# Patient Record
Sex: MALE | Race: WHITE | NOT HISPANIC OR LATINO
[De-identification: names, ages, dates, MRNs, and addresses within clinical notes are randomized per-mention and may not be internally consistent; named-entity substitution may affect disease eponyms.]

---

## 2017-01-05 ENCOUNTER — MEDICATION RENEWAL (OUTPATIENT)
Age: 62
End: 2017-01-05

## 2017-02-07 ENCOUNTER — MEDICATION RENEWAL (OUTPATIENT)
Age: 62
End: 2017-02-07

## 2017-02-07 ENCOUNTER — APPOINTMENT (OUTPATIENT)
Dept: PAIN MANAGEMENT | Facility: CLINIC | Age: 62
End: 2017-02-07

## 2017-03-07 ENCOUNTER — APPOINTMENT (OUTPATIENT)
Dept: PAIN MANAGEMENT | Facility: CLINIC | Age: 62
End: 2017-03-07

## 2017-03-07 VITALS
HEART RATE: 104 BPM | SYSTOLIC BLOOD PRESSURE: 147 MMHG | BODY MASS INDEX: 34.07 KG/M2 | WEIGHT: 274 LBS | DIASTOLIC BLOOD PRESSURE: 93 MMHG | HEIGHT: 75 IN

## 2017-03-29 ENCOUNTER — MEDICATION RENEWAL (OUTPATIENT)
Age: 62
End: 2017-03-29

## 2017-05-02 ENCOUNTER — APPOINTMENT (OUTPATIENT)
Dept: PAIN MANAGEMENT | Facility: CLINIC | Age: 62
End: 2017-05-02

## 2017-05-02 VITALS
WEIGHT: 274 LBS | HEART RATE: 99 BPM | BODY MASS INDEX: 34.07 KG/M2 | DIASTOLIC BLOOD PRESSURE: 92 MMHG | SYSTOLIC BLOOD PRESSURE: 161 MMHG | HEIGHT: 75 IN

## 2017-05-25 ENCOUNTER — MEDICATION RENEWAL (OUTPATIENT)
Age: 62
End: 2017-05-25

## 2017-06-27 ENCOUNTER — APPOINTMENT (OUTPATIENT)
Dept: PAIN MANAGEMENT | Facility: CLINIC | Age: 62
End: 2017-06-27

## 2017-06-27 VITALS
DIASTOLIC BLOOD PRESSURE: 102 MMHG | HEART RATE: 99 BPM | WEIGHT: 274 LBS | BODY MASS INDEX: 34.07 KG/M2 | HEIGHT: 75 IN | SYSTOLIC BLOOD PRESSURE: 159 MMHG

## 2017-07-24 ENCOUNTER — MEDICATION RENEWAL (OUTPATIENT)
Age: 62
End: 2017-07-24

## 2017-08-22 ENCOUNTER — APPOINTMENT (OUTPATIENT)
Dept: PAIN MANAGEMENT | Facility: CLINIC | Age: 62
End: 2017-08-22
Payer: MEDICARE

## 2017-08-22 VITALS
DIASTOLIC BLOOD PRESSURE: 95 MMHG | BODY MASS INDEX: 34.07 KG/M2 | HEIGHT: 75 IN | WEIGHT: 274 LBS | SYSTOLIC BLOOD PRESSURE: 146 MMHG | HEART RATE: 105 BPM

## 2017-08-22 PROCEDURE — 99214 OFFICE O/P EST MOD 30 MIN: CPT

## 2017-10-17 ENCOUNTER — APPOINTMENT (OUTPATIENT)
Dept: PAIN MANAGEMENT | Facility: CLINIC | Age: 62
End: 2017-10-17
Payer: MEDICARE

## 2017-10-17 VITALS
WEIGHT: 274 LBS | HEART RATE: 87 BPM | DIASTOLIC BLOOD PRESSURE: 110 MMHG | BODY MASS INDEX: 34.07 KG/M2 | HEIGHT: 75 IN | SYSTOLIC BLOOD PRESSURE: 179 MMHG

## 2017-10-17 PROCEDURE — 99213 OFFICE O/P EST LOW 20 MIN: CPT

## 2017-12-12 ENCOUNTER — APPOINTMENT (OUTPATIENT)
Dept: PAIN MANAGEMENT | Facility: CLINIC | Age: 62
End: 2017-12-12
Payer: MEDICARE

## 2017-12-12 VITALS
WEIGHT: 265 LBS | DIASTOLIC BLOOD PRESSURE: 80 MMHG | HEART RATE: 105 BPM | HEIGHT: 75 IN | BODY MASS INDEX: 32.95 KG/M2 | SYSTOLIC BLOOD PRESSURE: 128 MMHG

## 2017-12-12 PROCEDURE — 99213 OFFICE O/P EST LOW 20 MIN: CPT

## 2018-01-05 ENCOUNTER — MEDICATION RENEWAL (OUTPATIENT)
Age: 63
End: 2018-01-05

## 2018-01-30 ENCOUNTER — MOBILE ON CALL (OUTPATIENT)
Age: 63
End: 2018-01-30

## 2018-02-06 ENCOUNTER — APPOINTMENT (OUTPATIENT)
Dept: PAIN MANAGEMENT | Facility: CLINIC | Age: 63
End: 2018-02-06
Payer: MEDICARE

## 2018-02-06 VITALS
HEIGHT: 75 IN | HEART RATE: 59 BPM | SYSTOLIC BLOOD PRESSURE: 167 MMHG | BODY MASS INDEX: 32.95 KG/M2 | WEIGHT: 265 LBS | DIASTOLIC BLOOD PRESSURE: 114 MMHG

## 2018-02-06 PROCEDURE — 99213 OFFICE O/P EST LOW 20 MIN: CPT

## 2018-02-28 ENCOUNTER — MEDICATION RENEWAL (OUTPATIENT)
Age: 63
End: 2018-02-28

## 2018-03-27 ENCOUNTER — APPOINTMENT (OUTPATIENT)
Dept: COLORECTAL SURGERY | Facility: CLINIC | Age: 63
End: 2018-03-27

## 2018-03-30 ENCOUNTER — APPOINTMENT (OUTPATIENT)
Dept: PAIN MANAGEMENT | Facility: CLINIC | Age: 63
End: 2018-03-30
Payer: MEDICARE

## 2018-03-30 VITALS
WEIGHT: 262 LBS | BODY MASS INDEX: 32.58 KG/M2 | DIASTOLIC BLOOD PRESSURE: 84 MMHG | HEIGHT: 75 IN | HEART RATE: 90 BPM | SYSTOLIC BLOOD PRESSURE: 154 MMHG

## 2018-03-30 PROCEDURE — 99213 OFFICE O/P EST LOW 20 MIN: CPT

## 2018-04-26 ENCOUNTER — MEDICATION RENEWAL (OUTPATIENT)
Age: 63
End: 2018-04-26

## 2018-05-18 ENCOUNTER — MEDICATION RENEWAL (OUTPATIENT)
Age: 63
End: 2018-05-18

## 2018-06-26 ENCOUNTER — APPOINTMENT (OUTPATIENT)
Dept: PAIN MANAGEMENT | Facility: CLINIC | Age: 63
End: 2018-06-26

## 2018-07-24 ENCOUNTER — APPOINTMENT (OUTPATIENT)
Dept: PAIN MANAGEMENT | Facility: CLINIC | Age: 63
End: 2018-07-24
Payer: MEDICARE

## 2018-07-24 VITALS
WEIGHT: 242 LBS | DIASTOLIC BLOOD PRESSURE: 97 MMHG | HEIGHT: 75 IN | BODY MASS INDEX: 30.09 KG/M2 | SYSTOLIC BLOOD PRESSURE: 156 MMHG | HEART RATE: 99 BPM

## 2018-07-24 PROCEDURE — 99213 OFFICE O/P EST LOW 20 MIN: CPT

## 2018-08-15 ENCOUNTER — MEDICATION RENEWAL (OUTPATIENT)
Age: 63
End: 2018-08-15

## 2018-09-12 ENCOUNTER — MEDICATION RENEWAL (OUTPATIENT)
Age: 63
End: 2018-09-12

## 2018-10-16 ENCOUNTER — APPOINTMENT (OUTPATIENT)
Dept: PAIN MANAGEMENT | Facility: CLINIC | Age: 63
End: 2018-10-16
Payer: MEDICARE

## 2018-10-16 VITALS
BODY MASS INDEX: 29.22 KG/M2 | HEART RATE: 89 BPM | WEIGHT: 235 LBS | HEIGHT: 75 IN | DIASTOLIC BLOOD PRESSURE: 89 MMHG | SYSTOLIC BLOOD PRESSURE: 164 MMHG

## 2018-10-16 PROCEDURE — 99213 OFFICE O/P EST LOW 20 MIN: CPT

## 2018-11-07 ENCOUNTER — MEDICATION RENEWAL (OUTPATIENT)
Age: 63
End: 2018-11-07

## 2019-01-08 ENCOUNTER — APPOINTMENT (OUTPATIENT)
Dept: PAIN MANAGEMENT | Facility: CLINIC | Age: 64
End: 2019-01-08
Payer: MEDICARE

## 2019-01-08 VITALS
WEIGHT: 235 LBS | HEART RATE: 102 BPM | HEIGHT: 75 IN | DIASTOLIC BLOOD PRESSURE: 110 MMHG | BODY MASS INDEX: 29.22 KG/M2 | SYSTOLIC BLOOD PRESSURE: 172 MMHG

## 2019-01-08 PROCEDURE — 99213 OFFICE O/P EST LOW 20 MIN: CPT

## 2019-01-08 NOTE — REASON FOR VISIT
[Follow-Up: _____] : a [unfilled] follow-up visit [FreeTextEntry1] : chronic lower back pain and bilateral knee/leg pain.

## 2019-01-08 NOTE — ASSESSMENT
[Opioids] : Patient was explained in detail about pain control by using opioids. Patient has signed and fully understands our guidelines for medication and drug screening.  Patient understands the side effects of opioids, including, but not limited to, drug tolerance, dependence, potential for addiction. This class of drugs is habit-forming and NEELAM regulated. The sedative effects of opioids can be potentiated by taking alcohol or any sleeping pills, along with opioids. The decision to drive is patient’s responsibility, as opioids can affect his/her driving ability and ability to concentrate. The long-term place is not clear, however, patient understands that once the pain control optimizes, the goal will be to wean off the opioids. All the issues regarding opioid treatment have been addressed satisfactorily.

## 2019-01-08 NOTE — PHYSICAL EXAM
[General Appearance - Alert] : alert [General Appearance - In No Acute Distress] : in no acute distress [General Appearance - Well Nourished] : well nourished [General Appearance - Well Developed] : well developed [General Appearance - Well-Appearing] : healthy appearing [Oriented To Time, Place, And Person] : oriented to person, place, and time [Impaired Insight] : insight and judgment were intact [Affect] : the affect was normal [Mood] : the mood was normal [Memory Recent] : recent memory was not impaired [Memory Remote] : remote memory was not impaired [Motor Strength] : muscle strength was normal in all four extremities [No Muscle Atrophy] : normal bulk in all four extremities [Sensation Tactile Decrease] : light touch was intact [Sensation Pain / Temperature Decrease] : pain and temperature was intact [Balance] : balance was intact [FreeTextEntry6] : cane used for ambulation [Sclera] : the sclera and conjunctiva were normal [Outer Ear] : the ears and nose were normal in appearance [Neck Appearance] : the appearance of the neck was normal [Respiration, Rhythm And Depth] : normal respiratory rhythm and effort [Exaggerated Use Of Accessory Muscles For Inspiration] : no accessory muscle use [Edema] : there was no peripheral edema [Abdomen Soft] : soft [Abdomen Tenderness] : non-tender [Abdomen Mass (___ Cm)] : no abdominal mass palpated [FreeTextEntry1] : pain upon palpation of his lower back. [Abnormal Walk] : normal gait [Nail Clubbing] : no clubbing  or cyanosis of the fingernails [Involuntary Movements] : no involuntary movements were seen [Musculoskeletal - Swelling] : no joint swelling seen [Motor Tone] : muscle strength and tone were normal [Skin Color & Pigmentation] : normal skin color and pigmentation [Skin Turgor] : normal skin turgor [] : no rash [Skin Lesions] : no skin lesions

## 2019-01-08 NOTE — HISTORY OF PRESENT ILLNESS
[FreeTextEntry1] : He is experiencing more pain lately in his abdomen due to his cancer.  He is requesting that his short acting, breakthrough medication be increased by a few more tabs to help alleviate some of his pain.  At times he reports that his pain travels throughout his entire body. VAS=5/10 with his medications. As his medications wear off he reports 9-10/10.  He does walk daily for exercise.  He utilizes a lumbar back brace and a cane.  \par \par His leg wounds are stable. \par \par He is receiving chemo therapy for his bowel cancer.   After the chemo therapy for 2 days he suffers from diarrhea and extreme fatigue.  \par \par He denies change in strength or increase parasthesias.  He also denies bowel or bladder dysfunction.\par \par He denies depression and suicidal ideations. He had a good holiday with his daughter and grandchildren.  \par \par \par \par \par  \par

## 2019-01-08 NOTE — DISCUSSION/SUMMARY
[FreeTextEntry1] : His Istop was checked.  His prescriptions were renewed with the exception that his oxycodone 15 mgs was increased to 10 per day to account for his increase in cancer pain. He was encouraged to call if he is in pain and his medications will be adjusted further.  \par \par He will follow up with his GI md/oncologist. \par \par BP today-172/110. He was instructed again today to call his pcp today to report his BP and see what treatment is recommended.  He agreed to do so.  The importance of BP control discussed with the patient.  He is aware that failure to get his BP under control can lead to heart attack, stroke, or death. \par \par He shows no signs of aberrant behavior and appears to be using his medications reasonably and responsibly.  We discussed locked box for his medications and his opiate agreement was reviewed. \par  He denies any side effects to medications-bm regular. \par \par He will follow up in one months time for reevaluation or sooner if clinically indicated. .  [Opioids] : Patient was explained in detail about pain control by using opioids. Patient has signed and fully understands our guidelines for medication and drug screening.  Patient understands the side effects of opioids, including, but not limited to, drug tolerance, dependence, potential for addiction. This class of drugs is habit-forming and NEELAM regulated. The sedative effects of opioids can be potentiated by taking alcohol or any sleeping pills, along with opioids. The decision to drive is patient’s responsibility, as opioids can affect his/her driving ability and ability to concentrate. The long-term place is not clear, however, patient understands that once the pain control optimizes, the goal will be to wean off the opioids. All the issues regarding opioid treatment have been addressed satisfactorily.

## 2019-01-08 NOTE — REVIEW OF SYSTEMS
[As Noted in HPI] : as noted in HPI [Negative] : Heme/Lymph [FreeTextEntry9] : chronic back and leg pain

## 2019-01-30 ENCOUNTER — MEDICATION RENEWAL (OUTPATIENT)
Age: 64
End: 2019-01-30

## 2019-02-01 ENCOUNTER — MEDICATION RENEWAL (OUTPATIENT)
Age: 64
End: 2019-02-01

## 2019-03-01 ENCOUNTER — MEDICATION RENEWAL (OUTPATIENT)
Age: 64
End: 2019-03-01

## 2019-04-02 ENCOUNTER — APPOINTMENT (OUTPATIENT)
Dept: PAIN MANAGEMENT | Facility: CLINIC | Age: 64
End: 2019-04-02
Payer: MEDICARE

## 2019-04-02 VITALS
HEIGHT: 75 IN | DIASTOLIC BLOOD PRESSURE: 82 MMHG | BODY MASS INDEX: 30.96 KG/M2 | SYSTOLIC BLOOD PRESSURE: 140 MMHG | WEIGHT: 249 LBS | HEART RATE: 53 BPM

## 2019-04-02 PROCEDURE — 99213 OFFICE O/P EST LOW 20 MIN: CPT

## 2019-04-08 NOTE — HISTORY OF PRESENT ILLNESS
[FreeTextEntry1] : The patient returned today for a follow up ov.  He states that his pain has been controlled over the past few months.  He does still have abdominal pain due to his cancer diagnosis.  However, it is controlled with his medications.  At times he reports that his pain travels throughout his entire body. VAS=5-6/10 with his medications.  \par \par He utilizes a lumbar back brace and a cane when he ambulates.  It helps with his pain.  \par \par He is receiving chemo therapy for his bowel cancer.  He states that they recently changed his chemo therapy regimen to alleviate some of the side effects (severe diarrhea and fatigue).   \par \par He denies depression and suicidal ideations. He states that he wants to see his grandchildren grow up and is hopeful that the chemotherapy provides him some additional time.  His cancer is inoperable.\par \par No other medical complaints. \par  \par

## 2019-04-08 NOTE — DISCUSSION/SUMMARY
[Opioids] : Patient was explained in detail about pain control by using opioids. Patient has signed and fully understands our guidelines for medication and drug screening.  Patient understands the side effects of opioids, including, but not limited to, drug tolerance, dependence, potential for addiction. This class of drugs is habit-forming and NEELAM regulated. The sedative effects of opioids can be potentiated by taking alcohol or any sleeping pills, along with opioids. The decision to drive is patient’s responsibility, as opioids can affect his/her driving ability and ability to concentrate. The long-term place is not clear, however, patient understands that once the pain control optimizes, the goal will be to wean off the opioids. All the issues regarding opioid treatment have been addressed satisfactorily.  [FreeTextEntry1] : No signs of toxicity.  Will continue to monitor.  Istop was checked, #[id number].  His prescriptions were renewed without changes for now.  He was encouraged to call if he is in pain and his medications will be adjusted further.  \par \par He will follow up with his GI md/oncologist. \par \par BP today-140/82.\par \par He shows no signs of aberrant behavior and appears to be using his medications reasonably and responsibly.  We discussed locked box for his medications and his opiate agreement was reviewed. \par  He denies any side effects to medications-bm regular. \par \par He will follow up in three months time for reevaluation or sooner if clinically indicated. .

## 2019-04-08 NOTE — PHYSICAL EXAM
[General Appearance - Alert] : alert [General Appearance - In No Acute Distress] : in no acute distress [General Appearance - Well Nourished] : well nourished [General Appearance - Well Developed] : well developed [General Appearance - Well-Appearing] : healthy appearing [Oriented To Time, Place, And Person] : oriented to person, place, and time [Impaired Insight] : insight and judgment were intact [Affect] : the affect was normal [Mood] : the mood was normal [Memory Recent] : recent memory was not impaired [Memory Remote] : remote memory was not impaired [Motor Strength] : muscle strength was normal in all four extremities [No Muscle Atrophy] : normal bulk in all four extremities [Sensation Tactile Decrease] : light touch was intact [Sensation Pain / Temperature Decrease] : pain and temperature was intact [Balance] : balance was intact [Sclera] : the sclera and conjunctiva were normal [Outer Ear] : the ears and nose were normal in appearance [Neck Appearance] : the appearance of the neck was normal [Respiration, Rhythm And Depth] : normal respiratory rhythm and effort [Exaggerated Use Of Accessory Muscles For Inspiration] : no accessory muscle use [Edema] : there was no peripheral edema [Abdomen Soft] : soft [Abdomen Tenderness] : non-tender [Abdomen Mass (___ Cm)] : no abdominal mass palpated [Abnormal Walk] : normal gait [Nail Clubbing] : no clubbing  or cyanosis of the fingernails [Involuntary Movements] : no involuntary movements were seen [Musculoskeletal - Swelling] : no joint swelling seen [Motor Tone] : muscle strength and tone were normal [Skin Color & Pigmentation] : normal skin color and pigmentation [Skin Turgor] : normal skin turgor [] : no rash [Skin Lesions] : no skin lesions [FreeTextEntry6] : cane used for ambulation [FreeTextEntry1] : pain upon palpation of his lower back.

## 2019-04-25 ENCOUNTER — MEDICATION RENEWAL (OUTPATIENT)
Age: 64
End: 2019-04-25

## 2019-05-14 ENCOUNTER — MESSAGE (OUTPATIENT)
Age: 64
End: 2019-05-14

## 2019-06-06 ENCOUNTER — MEDICATION RENEWAL (OUTPATIENT)
Age: 64
End: 2019-06-06

## 2019-06-25 ENCOUNTER — APPOINTMENT (OUTPATIENT)
Dept: PAIN MANAGEMENT | Facility: CLINIC | Age: 64
End: 2019-06-25

## 2019-06-25 ENCOUNTER — MEDICATION RENEWAL (OUTPATIENT)
Age: 64
End: 2019-06-25

## 2019-07-16 ENCOUNTER — APPOINTMENT (OUTPATIENT)
Dept: PAIN MANAGEMENT | Facility: CLINIC | Age: 64
End: 2019-07-16
Payer: MEDICARE

## 2019-07-16 VITALS
HEIGHT: 75 IN | WEIGHT: 225 LBS | BODY MASS INDEX: 27.98 KG/M2 | HEART RATE: 92 BPM | DIASTOLIC BLOOD PRESSURE: 90 MMHG | SYSTOLIC BLOOD PRESSURE: 150 MMHG

## 2019-07-16 PROCEDURE — 99214 OFFICE O/P EST MOD 30 MIN: CPT

## 2019-07-16 NOTE — DISCUSSION/SUMMARY
[Opioids] : Patient was explained in detail about pain control by using opioids. Patient has signed and fully understands our guidelines for medication and drug screening.  Patient understands the side effects of opioids, including, but not limited to, drug tolerance, dependence, potential for addiction. This class of drugs is habit-forming and NEELAM regulated. The sedative effects of opioids can be potentiated by taking alcohol or any sleeping pills, along with opioids. The decision to drive is patient’s responsibility, as opioids can affect his/her driving ability and ability to concentrate. The long-term place is not clear, however, patient understands that once the pain control optimizes, the goal will be to wean off the opioids. All the issues regarding opioid treatment have been addressed satisfactorily.  [FreeTextEntry1] :  reviewed.  No signs of toxicity.  Will continue to monitor.  His prescriptions were renewed. He was encouraged to call if he is in pain and his medications will be adjusted further due to his bowel cancer.  \par \par He will follow up with his GI/oncologist. \par \par BP today-150/90.  He was advised to contact his pcp due to his high blood pressure.  He was in agreement and said that he would. \par \par No signs of aberrant behavior noted and appears to be using his medications reasonably and responsibly. We discussed locked box for his medications and his opiate agreement was reviewed. \par   He denies any side effects to medications. \par \par He will follow up in three months time for reevaluation or sooner if clinically indicated.\par \par Dr. Lopez on site.  Billed incident to the service.

## 2019-07-16 NOTE — HISTORY OF PRESENT ILLNESS
[FreeTextEntry1] : Mr. Gray returned today for a follow up visit.  He was accompanied by his brother who is helping with his care.  Currently he reports that his pain has been well controlled.  His medications are providing both decrease pain and increase functioning.  He is not presently working but he is active at home.  He reports that his abdominal pain due to his colon cancer diagnosis is also presently controlled.  At times he reports that his pain travels throughout his entire body. VAS=6/10 average with his medications.  \par \par He is not presently using his lumbar back brace or cane.  He came to his appt today in a wheelchair.  He states that the distance from car into office would really tire him out.  He uses a walker at home for stability.  He fell approximately 2 and 1/2 weeks ago, tripped on flip flop. He hit his right rib, right shoulder, and head.  He was evaluated and had xrays of his shoulder and rib.  He also had CT scan of his head to r/o hemorrhage.  He reports that the head CT was negative and the shoulder and rib xrays were negative as well.   \par \par He continues to receive chemo therapy for his bowel cancer.  He often has fatigue. Also it should be noted that Mr. Gray recently had a severe infection in his right thigh, requiring debridment, wound vac, IV antibiotics, and frequent dressing changes.  I reviewed pictures with him today.  His would appears to be healing nicely.  His brother assists him with dressing changes and he still has nurses coming to the house. \par \par He denies depression/suicidal ideations. He continues to be positive.  He continues to state that he wants to watch his grandchildren grow up.  He showed me a lot of pictures today and talk about them and their activities.  His cancer is inoperable and when they diagnosed him two years ago they had estimated two years.\par \par No other medical complaints. \par

## 2019-08-13 ENCOUNTER — MEDICATION RENEWAL (OUTPATIENT)
Age: 64
End: 2019-08-13

## 2019-08-14 ENCOUNTER — MEDICATION RENEWAL (OUTPATIENT)
Age: 64
End: 2019-08-14

## 2019-09-17 ENCOUNTER — MEDICATION RENEWAL (OUTPATIENT)
Age: 64
End: 2019-09-17

## 2019-10-08 ENCOUNTER — APPOINTMENT (OUTPATIENT)
Dept: PAIN MANAGEMENT | Facility: CLINIC | Age: 64
End: 2019-10-08
Payer: MEDICARE

## 2019-10-08 VITALS
SYSTOLIC BLOOD PRESSURE: 171 MMHG | HEIGHT: 75 IN | DIASTOLIC BLOOD PRESSURE: 94 MMHG | WEIGHT: 244 LBS | BODY MASS INDEX: 30.34 KG/M2 | HEART RATE: 88 BPM

## 2019-10-08 PROCEDURE — 99213 OFFICE O/P EST LOW 20 MIN: CPT

## 2019-10-08 NOTE — DISCUSSION/SUMMARY
[Opioids] : Patient was explained in detail about pain control by using opioids. Patient has signed and fully understands our guidelines for medication and drug screening.  Patient understands the side effects of opioids, including, but not limited to, drug tolerance, dependence, potential for addiction. This class of drugs is habit-forming and NEELAM regulated. The sedative effects of opioids can be potentiated by taking alcohol or any sleeping pills, along with opioids. The decision to drive is patient’s responsibility, as opioids can affect his/her driving ability and ability to concentrate. The long-term place is not clear, however, patient understands that once the pain control optimizes, the goal will be to wean off the opioids. All the issues regarding opioid treatment have been addressed satisfactorily.  [FreeTextEntry1] : [facility] registry reviewed (#[id number]). No signs of toxicity.  Will continue to monitor.  His prescriptions were renewed without changes for now. He was encouraged to call if he is in pain and his medications will be adjusted further due to his colon cancer.  \par \par He will follow up with his GI/oncologist. \par \par BP today-171/94.  He was advised to contact his pcp due to his high blood pressure.  He was in agreement and said that he would. The importance of getting his BP controlled reviewed. \par \par No signs of aberrant behavior noted and appears to be using his medications reasonably and responsibly. We discussed locked box for his medications and his opiate agreement was reviewed. \par   He denies any side effects to medications. \par \par He will follow up in three months time for reevaluation or sooner if clinically indicated.\par \par Dr. Lopez on site.  Billed incident to the service.

## 2019-10-08 NOTE — HISTORY OF PRESENT ILLNESS
[FreeTextEntry1] : Mr. Gray returned today for a follow up office visit.  He was accompanied by his brother who is helping with his care.  He came in the wheelchair today.  He states that the skin on his feet are peeling so bad due to the chemotherapy that it is painful to even walk.  His brother has been monitoring.  He denies redness, drainage, and warmth.  He states that his oncologist is aware.  He also states that his oncologist is please with his response to chemotherapy.   While he knows that there is no cure for his colon cancer diagnosis, he is trying to get more years to see his grandchildren grow.  He denies depression/suicidal ideations.   \par \par He reports that his medications continue to provide relief.  He reports both decrease pain and increase functioning around his house.  At times his pain travels throughout his entire body. VAS=5-6/10 average with his medications.  \par \par No other medical complaints. \par

## 2019-10-08 NOTE — PHYSICAL EXAM
[General Appearance - Alert] : alert [General Appearance - In No Acute Distress] : in no acute distress [General Appearance - Well Nourished] : well nourished [General Appearance - Well Developed] : well developed [General Appearance - Well-Appearing] : healthy appearing [Oriented To Time, Place, And Person] : oriented to person, place, and time [Impaired Insight] : insight and judgment were intact [Mood] : the mood was normal [Affect] : the affect was normal [Memory Recent] : recent memory was not impaired [Memory Remote] : remote memory was not impaired [Motor Strength] : muscle strength was normal in all four extremities [No Muscle Atrophy] : normal bulk in all four extremities [Sensation Tactile Decrease] : light touch was intact [Sensation Pain / Temperature Decrease] : pain and temperature was intact [Balance] : balance was intact [Sclera] : the sclera and conjunctiva were normal [Outer Ear] : the ears and nose were normal in appearance [Neck Appearance] : the appearance of the neck was normal [Respiration, Rhythm And Depth] : normal respiratory rhythm and effort [Edema] : there was no peripheral edema [Exaggerated Use Of Accessory Muscles For Inspiration] : no accessory muscle use [Abdomen Soft] : soft [Abdomen Tenderness] : non-tender [Abdomen Mass (___ Cm)] : no abdominal mass palpated [Abnormal Walk] : normal gait [Involuntary Movements] : no involuntary movements were seen [Nail Clubbing] : no clubbing  or cyanosis of the fingernails [Musculoskeletal - Swelling] : no joint swelling seen [Motor Tone] : muscle strength and tone were normal [Skin Color & Pigmentation] : normal skin color and pigmentation [Skin Turgor] : normal skin turgor [] : no rash [Skin Lesions] : no skin lesions [FreeTextEntry6] : cane used for ambulation [FreeTextEntry1] : pain upon palpation of his lower back.

## 2019-11-01 ENCOUNTER — MEDICATION RENEWAL (OUTPATIENT)
Age: 64
End: 2019-11-01

## 2019-11-19 ENCOUNTER — MEDICATION RENEWAL (OUTPATIENT)
Age: 64
End: 2019-11-19

## 2019-12-13 ENCOUNTER — MEDICATION RENEWAL (OUTPATIENT)
Age: 64
End: 2019-12-13

## 2020-01-07 ENCOUNTER — APPOINTMENT (OUTPATIENT)
Dept: PAIN MANAGEMENT | Facility: CLINIC | Age: 65
End: 2020-01-07
Payer: MEDICARE

## 2020-01-07 VITALS
WEIGHT: 237 LBS | SYSTOLIC BLOOD PRESSURE: 148 MMHG | DIASTOLIC BLOOD PRESSURE: 87 MMHG | HEART RATE: 92 BPM | BODY MASS INDEX: 29.47 KG/M2 | HEIGHT: 75 IN

## 2020-01-07 PROCEDURE — 99213 OFFICE O/P EST LOW 20 MIN: CPT

## 2020-01-07 RX ORDER — MORPHINE SULFATE 30 MG/1
30 TABLET, FILM COATED, EXTENDED RELEASE ORAL
Qty: 120 | Refills: 0 | Status: DISCONTINUED | COMMUNITY
Start: 2018-07-24 | End: 2020-01-07

## 2020-01-07 RX ORDER — MORPHINE SULFATE 15 MG/1
15 TABLET, FILM COATED, EXTENDED RELEASE ORAL
Qty: 90 | Refills: 0 | Status: DISCONTINUED | COMMUNITY
Start: 2019-11-01 | End: 2020-01-07

## 2020-01-13 NOTE — REVIEW OF SYSTEMS
[As Noted in HPI] : as noted in HPI [Negative] : Endocrine [FreeTextEntry9] : chronic back and leg pain

## 2020-01-13 NOTE — DISCUSSION/SUMMARY
[Opioids] : Patient was explained in detail about pain control by using opioids. Patient has signed and fully understands our guidelines for medication and drug screening.  Patient understands the side effects of opioids, including, but not limited to, drug tolerance, dependence, potential for addiction. This class of drugs is habit-forming and NEELAM regulated. The sedative effects of opioids can be potentiated by taking alcohol or any sleeping pills, along with opioids. The decision to drive is patient’s responsibility, as opioids can affect his/her driving ability and ability to concentrate. The long-term place is not clear, however, patient understands that once the pain control optimizes, the goal will be to wean off the opioids. All the issues regarding opioid treatment have been addressed satisfactorily.  [FreeTextEntry1] :  registry reviewed. No signs of toxicity.  Will continue to monitor.  His prescriptions were renewed with the exception that his morphine er was increase to 60 mgs, 1 tablet po bid and 2 po qhs, MDD=4.\par \par He will follow up with his GI/oncologist. \par \par BP today-148/87. \par \par No signs of aberrant behavior noted and appears to be using his medications reasonably and responsibly. We discussed locked box for his medications and his opiate agreement was reviewed. \par   He denies any side effects to medications. \par \par He will follow up in three months time for reevaluation or sooner if clinically indicated.\par \par Dr. Lopez on site.  Billed incident to the service.

## 2020-01-13 NOTE — HISTORY OF PRESENT ILLNESS
[FreeTextEntry1] : Mr. Gray returned today for a follow up appointment, accompanied by his brother.  He came in the wheelchair today. He was initially under our care for treatment of his chronic pain.  Now he is also under our care for treatment of his metastatic bowel cancer pain.  \par \par While he knows that there is no cure for his colon cancer diagnosis, he is trying to get more years to see his grandchildren grow.  He denies depression/suicidal ideations.   \par \par He reports that his pain has been getting worse.  The medications aren't helping as much as they use to.  There are times when his pain reaches 9-10/10.   At these times his pain radiates throughout his entire body. \par \par No other medical complaints at this time. \par

## 2020-01-13 NOTE — PHYSICAL EXAM
[General Appearance - Alert] : alert [General Appearance - Well Developed] : well developed [General Appearance - Well Nourished] : well nourished [General Appearance - In No Acute Distress] : in no acute distress [Oriented To Time, Place, And Person] : oriented to person, place, and time [General Appearance - Well-Appearing] : healthy appearing [Affect] : the affect was normal [Impaired Insight] : insight and judgment were intact [Mood] : the mood was normal [Memory Recent] : recent memory was not impaired [Memory Remote] : remote memory was not impaired [Motor Strength] : muscle strength was normal in all four extremities [No Muscle Atrophy] : normal bulk in all four extremities [Sensation Tactile Decrease] : light touch was intact [Balance] : balance was intact [Sensation Pain / Temperature Decrease] : pain and temperature was intact [Sclera] : the sclera and conjunctiva were normal [Neck Appearance] : the appearance of the neck was normal [Outer Ear] : the ears and nose were normal in appearance [Respiration, Rhythm And Depth] : normal respiratory rhythm and effort [Exaggerated Use Of Accessory Muscles For Inspiration] : no accessory muscle use [Edema] : there was no peripheral edema [Abdomen Tenderness] : non-tender [Abdomen Soft] : soft [Abdomen Mass (___ Cm)] : no abdominal mass palpated [Abnormal Walk] : normal gait [Nail Clubbing] : no clubbing  or cyanosis of the fingernails [Musculoskeletal - Swelling] : no joint swelling seen [Involuntary Movements] : no involuntary movements were seen [Motor Tone] : muscle strength and tone were normal [Skin Color & Pigmentation] : normal skin color and pigmentation [Skin Turgor] : normal skin turgor [] : no rash [Skin Lesions] : no skin lesions [FreeTextEntry6] : cane used for ambulation [FreeTextEntry1] : pain upon palpation of his lower back.

## 2020-04-29 ENCOUNTER — APPOINTMENT (OUTPATIENT)
Dept: PAIN MANAGEMENT | Facility: CLINIC | Age: 65
End: 2020-04-29
Payer: MEDICARE

## 2020-04-29 DIAGNOSIS — C80.1 MALIGNANT (PRIMARY) NEOPLASM, UNSPECIFIED: ICD-10-CM

## 2020-04-29 DIAGNOSIS — M54.5 LOW BACK PAIN: ICD-10-CM

## 2020-04-29 DIAGNOSIS — M54.16 RADICULOPATHY, LUMBAR REGION: ICD-10-CM

## 2020-04-29 DIAGNOSIS — G89.4 CHRONIC PAIN SYNDROME: ICD-10-CM

## 2020-04-29 PROCEDURE — 99442: CPT | Mod: CR

## 2020-04-30 PROBLEM — C80.1 CANCER: Status: ACTIVE | Noted: 2019-10-08

## 2020-07-08 ENCOUNTER — APPOINTMENT (OUTPATIENT)
Dept: PAIN MANAGEMENT | Facility: CLINIC | Age: 65
End: 2020-07-08

## 2020-07-23 ENCOUNTER — APPOINTMENT (OUTPATIENT)
Dept: PAIN MANAGEMENT | Facility: CLINIC | Age: 65
End: 2020-07-23
Payer: MEDICARE

## 2020-07-23 VITALS — SYSTOLIC BLOOD PRESSURE: 138 MMHG | DIASTOLIC BLOOD PRESSURE: 82 MMHG | HEART RATE: 81 BPM

## 2020-07-23 PROCEDURE — 99213 OFFICE O/P EST LOW 20 MIN: CPT

## 2020-07-23 RX ORDER — MORPHINE SULFATE 60 MG/1
60 TABLET, FILM COATED, EXTENDED RELEASE ORAL
Qty: 90 | Refills: 0 | Status: ACTIVE | COMMUNITY
Start: 2019-11-01 | End: 1900-01-01

## 2020-07-23 RX ORDER — MORPHINE SULFATE 30 MG/1
30 TABLET, FILM COATED, EXTENDED RELEASE ORAL
Qty: 30 | Refills: 0 | Status: ACTIVE | COMMUNITY
Start: 2020-06-08 | End: 1900-01-01

## 2020-07-23 NOTE — REVIEW OF SYSTEMS
[Fever] : no fever [Palpitations] : no palpitations [Chills] : no chills [Chest Pain] : no chest pain [Shortness Of Breath] : no shortness of breath

## 2020-07-23 NOTE — HISTORY OF PRESENT ILLNESS
[FreeTextEntry1] : Pt returns today for a followup visit. He continues to have chronic pain. \par He explains he has increased pain from chemotherapy. Chemotherapy is done every other week. \par He has constipation and then diarrhea - due to chemotherapy . \par Pt has decreased opioid dosages over the last 2 refills.We will take a break from decreasing this month. \par No signs of toxicity noted. \par Pt denies alcohol use. \par Pt reports mood to be stable. \par Pt is in a wheel chair today.

## 2020-07-23 NOTE — ASSESSMENT
[Opioids] : Patient was explained in detail about pain control by using opioids. Patient has signed and fully understands our guidelines for medication and drug screening.  Patient understands the side effects of opioids, including, but not limited to, drug tolerance, dependence, potential for addiction. This class of drugs is habit-forming and NEELAM regulated. The sedative effects of opioids can be potentiated by taking alcohol or any sleeping pills, along with opioids. The decision to drive is patient’s responsibility, as opioids can affect his/her driving ability and ability to concentrate. The long-term place is not clear, however, patient understands that once the pain control optimizes, the goal will be to wean off the opioids. All the issues regarding opioid treatment have been addressed satisfactorily.  [FreeTextEntry1] : No changes today \par \par ISTOP # [id number]\par \par I will continue to monitor for toxicity. \par \par Dr Lopez on site today. Billed incident to service.

## 2020-07-23 NOTE — PHYSICAL EXAM
[General Appearance - Alert] : alert [] : normal voice and communication [Oriented To Time, Place, And Person] : oriented to person, place, and time [Affect] : the affect was normal [Sclera] : the sclera and conjunctiva were normal [Mood] : the mood was normal

## 2020-09-03 ENCOUNTER — APPOINTMENT (OUTPATIENT)
Dept: PAIN MANAGEMENT | Facility: CLINIC | Age: 65
End: 2020-09-03

## 2024-11-25 NOTE — REASON FOR VISIT
You can access the FollowMyHealth Patient Portal offered by [facility] by registering at the following website: [email or web address]. By joining [employer]’s FollowMyHealth portal, you will also be able to view your health information using other applications (apps) compatible with our system. [Follow-Up: _____] : a [unfilled] follow-up visit [FreeTextEntry1] : chronic lower back pain and bilateral knee/leg pain.